# Patient Record
Sex: MALE | Race: WHITE
[De-identification: names, ages, dates, MRNs, and addresses within clinical notes are randomized per-mention and may not be internally consistent; named-entity substitution may affect disease eponyms.]

---

## 2021-04-14 ENCOUNTER — HOSPITAL ENCOUNTER (OUTPATIENT)
Dept: HOSPITAL 53 - M LABSMTC | Age: 66
End: 2021-04-14
Attending: ANESTHESIOLOGY
Payer: COMMERCIAL

## 2021-04-14 DIAGNOSIS — Z20.822: ICD-10-CM

## 2021-04-14 DIAGNOSIS — Z01.812: Primary | ICD-10-CM

## 2021-04-19 ENCOUNTER — HOSPITAL ENCOUNTER (OUTPATIENT)
Dept: HOSPITAL 53 - M OPP | Age: 66
Discharge: HOME | End: 2021-04-19
Attending: INTERNAL MEDICINE
Payer: COMMERCIAL

## 2021-04-19 VITALS — BODY MASS INDEX: 37.52 KG/M2 | HEIGHT: 71 IN | WEIGHT: 268 LBS

## 2021-04-19 DIAGNOSIS — Z79.84: ICD-10-CM

## 2021-04-19 DIAGNOSIS — Z91.048: ICD-10-CM

## 2021-04-19 DIAGNOSIS — R12: ICD-10-CM

## 2021-04-19 DIAGNOSIS — R19.7: ICD-10-CM

## 2021-04-19 DIAGNOSIS — K21.9: ICD-10-CM

## 2021-04-19 DIAGNOSIS — Z79.899: ICD-10-CM

## 2021-04-19 DIAGNOSIS — I10: ICD-10-CM

## 2021-04-19 DIAGNOSIS — E11.9: ICD-10-CM

## 2021-04-19 DIAGNOSIS — K64.0: Primary | ICD-10-CM

## 2021-04-19 DIAGNOSIS — Z98.84: ICD-10-CM

## 2021-04-19 PROCEDURE — 43239 EGD BIOPSY SINGLE/MULTIPLE: CPT

## 2021-04-19 PROCEDURE — 88305 TISSUE EXAM BY PATHOLOGIST: CPT

## 2021-04-19 PROCEDURE — 45380 COLONOSCOPY AND BIOPSY: CPT

## 2021-04-19 NOTE — ROOR
________________________________________________________________________________

Patient Name: David Kim              Procedure Date: 4/19/2021 10:15 AM

MRN: [id number]                          Account Number: [id number]

YOB: 1955                Age: 65

Room: [facility]                            Gender: Male

Note Status: Finalized                 

________________________________________________________________________________

 

Procedure:            Total Colonoscopy to Cecum + ileoscopy + Bx

Indications:          Clinically significant diarrhea of unexplained origin

Providers:            Dashawn Gonsalves MD

Referring MD:         ANNMARIE DUMONT MD

Requesting Provider:  

Medicines:            Monitored Anesthesia Care

Complications:        No immediate complications.

________________________________________________________________________________

Procedure:            Pre-Anesthesia Assessment:

                      - The heart rate, respiratory rate, oxygen saturations, 

                      blood pressure, adequacy of pulmonary ventilation, and 

                      response to care were monitored throughout the procedure.

                      The Colonoscope was introduced through the anus and 

                      advanced to the terminal ileum, with identification of 

                      the appendiceal orifice and IC valve. The colonoscopy 

                      was performed without difficulty. The patient tolerated 

                      the procedure well. The quality of the bowel preparation 

                      was good.

                                                                                

Findings:

     The perianal and digital rectal examinations were normal.

     Non-bleeding internal hemorrhoids were found during retroflexion. The 

     hemorrhoids were small and Grade I (internal hemorrhoids that do not 

     prolapse).

     Retroflexion in the right colon was performed.

     The terminal ileum appeared normal.

     Biopsies for histology were taken with a cold forceps from the ascending 

     colon, transverse colon, descending colon and rectosigmoid colon for 

     evaluation of microscopic colitis.

     The exam was otherwise without abnormality on direct and retroflexion 

     views.

                                                                                

Impression:           - Non-bleeding internal hemorrhoids.

                      - The examined portion of the ileum was normal.

                      - The examination was otherwise normal on direct and 

                      retroflexion views.

                      - Biopsies were taken with a cold forceps from the 

                      ascending colon, transverse colon, descending colon and 

                      rectosigmoid colon for evaluation of microscopic colitis.

                      - The exam was otherwise normal to the cecum.

Recommendation:       - Patient has a contact number available for 

                      emergencies. The signs and symptoms of potential delayed 

                      complications were discussed with the patient. Return to 

                      normal activities tomorrow. Written discharge 

                      instructions were provided to the patient.

                      - High fiber diet.

                      - Discharge patient to home.

                      - Continue present medications.

                      - Await pathology results.

                      - Telephone GI clinic for pathology results in 1 week.

                      - Repeat colonoscopy in 10 years for screening purposes.

                      - Return to referring physician.

                      - The findings and recommendations were discussed with 

                      the patient.

                                                                                

Procedure Code(s):    --- Professional ---

                      29138, Colonoscopy, flexible; with biopsy, single or 

                      multiple

Diagnosis Code(s):    --- Professional ---

                      K64.0, First degree hemorrhoids

                      R19.7, Diarrhea, unspecified

 

CPT copyright 2019 [employer]. All rights reserved.

 

The codes documented in this report are preliminary and upon  review may 

be revised to meet current compliance requirements.

 

Dashawn Gonsalves MD

____________________

Dashawn Gonsalves MD

4/19/2021 10:48:53 AM

Electronically signed by Dashawn Gonsalves MD

Number of Addenda: 0

 

Note Initiated On: 4/19/2021 10:15 AM

Estimated Blood Loss: Estimated blood loss: none.

## 2021-04-19 NOTE — ROOR
________________________________________________________________________________

Patient Name: David Kim              Procedure Date: 4/19/2021 10:14 AM

MRN: [id number]                          Account Number: [id number]

YOB: 1955                Age: 65

Room: [facility]                            Gender: Male

Note Status: Finalized                 

________________________________________________________________________________

 

Procedure:            Upper Endoscopy + Biopsies

Indications:          Heartburn, Endoscopy to assess diarrhea in patient 

                      suspected of having celiac disease

Providers:            Dashawn Gonsalves MD

Referring MD:         ANNMARIE DUMONT MD

Requesting Provider:  

Medicines:            Monitored Anesthesia Care

Complications:        No immediate complications.

________________________________________________________________________________

Procedure:            Pre-Anesthesia Assessment:

                      - The heart rate, respiratory rate, oxygen saturations, 

                      blood pressure, adequacy of pulmonary ventilation, and 

                      response to care were monitored throughout the procedure.

                      The Endoscope was introduced through the mouth, and 

                      advanced to the second part of duodenum. The upper GI 

                      endoscopy was accomplished without difficulty. The 

                      patient tolerated the procedure well.

                                                                                

Findings:

     The Z-line was regular and was found 45 cm from the incisors.

     Evidence of a gastric bypass was found. A gastric pouch with a small size 

     was found. The staple line appeared intact. The gastrojejunal anastomosis 

     was characterized by healthy appearing mucosa. This was traversed. The 

     pouch-to-jejunum limb was characterized by healthy appearing mucosa. 

     Multiple biopsies were obtained with cold forceps for evaluation of 

     celiac disease randomly in the efferent jejunal loop.

     The exam was otherwise without abnormality.

                                                                                

Impression:           - Z-line regular, 45 cm from the incisors.

                      - Gastric bypass with a small-sized pouch and intact 

                      staple line. Gastrojejunal anastomosis characterized by 

                      healthy appearing mucosa.

                      - The examination was otherwise normal.

                      - Multiple biopsies were obtained in the efferent 

                      jejunal loop.

                      - The examination was otherwise normal.

Recommendation:       - Patient has a contact number available for 

                      emergencies. The signs and symptoms of potential delayed 

                      complications were discussed with the patient. Return to 

                      normal activities tomorrow. Written discharge 

                      instructions were provided to the patient.

                      - Resume previous diet.

                      - Discharge patient to home.

                      - Continue present medications.

                      - Await pathology results.

                      - Telephone GI clinic for pathology results in 1 week.

                      - Return to referring physician.

                      - The findings and recommendations were discussed with 

                      the patient.

                                                                                

Procedure Code(s):    --- Professional ---

                      68046, Esophagogastroduodenoscopy, flexible, transoral; 

                      with biopsy, single or multiple

Diagnosis Code(s):    --- Professional ---

                      Z98.84, Bariatric surgery status

                      R12, Heartburn

                      R19.7, Diarrhea, unspecified

 

CPT copyright 2019 [employer]. All rights reserved.

 

The codes documented in this report are preliminary and upon  review may 

be revised to meet current compliance requirements.

 

Dashawn Gonsalves MD

____________________

Dashawn Gonsalves MD

4/19/2021 10:31:10 AM

Electronically signed by Dashawn Gonsalves MD

Number of Addenda: 0

 

Note Initiated On: 4/19/2021 10:14 AM

Estimated Blood Loss: Estimated blood loss: none.